# Patient Record
Sex: FEMALE | Race: WHITE
[De-identification: names, ages, dates, MRNs, and addresses within clinical notes are randomized per-mention and may not be internally consistent; named-entity substitution may affect disease eponyms.]

---

## 2017-04-21 ENCOUNTER — HOSPITAL ENCOUNTER (EMERGENCY)
Dept: HOSPITAL 26 - MED | Age: 42
Discharge: HOME | End: 2017-04-21
Payer: COMMERCIAL

## 2017-04-21 VITALS — SYSTOLIC BLOOD PRESSURE: 136 MMHG | DIASTOLIC BLOOD PRESSURE: 83 MMHG

## 2017-04-21 VITALS — WEIGHT: 245 LBS | HEIGHT: 65 IN | BODY MASS INDEX: 40.82 KG/M2

## 2017-04-21 VITALS — DIASTOLIC BLOOD PRESSURE: 79 MMHG | SYSTOLIC BLOOD PRESSURE: 138 MMHG

## 2017-04-21 DIAGNOSIS — R60.9: Primary | ICD-10-CM

## 2017-04-21 DIAGNOSIS — Z85.42: ICD-10-CM

## 2017-04-21 DIAGNOSIS — Z88.1: ICD-10-CM

## 2017-04-21 DIAGNOSIS — R05: ICD-10-CM

## 2017-04-21 LAB
ALBUMIN FLD-MCNC: 3.7 G/DL (ref 3.4–5)
ALP SERPL-CCNC: 54 U/L (ref 46–116)
ALT SERPL-CCNC: 22 U/L (ref 12–78)
ANION GAP SERPL CALCULATED.3IONS-SCNC: 13.3 MMOL/L (ref 8–16)
APTT PPP: 25.6 SECS (ref 22–35.6)
AST SERPL-CCNC: 13 U/L (ref 15–37)
BASOPHILS # BLD AUTO: 0.1 K/UL (ref 0–0.22)
BASOPHILS NFR BLD AUTO: 1.1 % (ref 0–2)
BILIRUB SERPL-MCNC: 0.4 MG/DL (ref 0–1)
BUN SERPL-MCNC: 16 MG/DL (ref 7–18)
CALCIUM SPEC-MCNC: 8.8 MG/DL (ref 8.5–10.1)
CHLORIDE SERPL-SCNC: 102 MMOL/L (ref 98–107)
CO2 SERPL-SCNC: 28.6 MMOL/L (ref 21–32)
CREAT SERPL-MCNC: 0.7 MG/DL (ref 0.6–1.3)
EOSINOPHIL # BLD AUTO: 0.1 K/UL (ref 0–0.4)
EOSINOPHIL NFR BLD AUTO: 1.3 % (ref 0–4)
ERYTHROCYTE [DISTWIDTH] IN BLOOD BY AUTOMATED COUNT: 13.5 % (ref 11.6–13.7)
GFR SERPL CREATININE-BSD FRML MDRD: 119 ML/MIN (ref 90–?)
GLUCOSE SERPL-MCNC: 77 MG/DL (ref 74–106)
HCT VFR BLD AUTO: 41 % (ref 36–48)
HGB BLD-MCNC: 13.3 G/DL (ref 12–16)
INR PPP: 1.1 (ref 0.8–1.2)
LYMPHOCYTES # BLD AUTO: 2.8 K/UL (ref 2.5–16.5)
LYMPHOCYTES NFR BLD AUTO: 26.5 % (ref 20.5–51.1)
MCH RBC QN AUTO: 28 PG (ref 27–31)
MCHC RBC AUTO-ENTMCNC: 32 G/DL (ref 33–37)
MCV RBC AUTO: 87 FL (ref 80–94)
MONOCYTES # BLD AUTO: 0.8 K/UL (ref 0.8–1)
MONOCYTES NFR BLD AUTO: 8 % (ref 1.7–9.3)
NEUTROPHILS # BLD AUTO: 6.7 K/UL (ref 1.8–7.7)
NEUTROPHILS NFR BLD AUTO: 63.1 % (ref 42.2–75.2)
PLATELET # BLD AUTO: 384 K/UL (ref 140–450)
POTASSIUM SERPL-SCNC: 3.9 MMOL/L (ref 3.5–5.1)
PROT SERPL-MCNC: 8 G/DL (ref 6.4–8.2)
PROTHROMBIN TIME: 10.1 SECS (ref 10.8–13.4)
RBC # BLD AUTO: 4.69 MIL/UL (ref 4.2–5.4)
SODIUM SERPL-SCNC: 140 MMOL/L (ref 136–145)
WBC # BLD AUTO: 10.5 K/UL (ref 4.8–10.8)

## 2017-04-21 NOTE — NUR
41Y F PRESENTED TO ER C/O OF SWELLING TO BOTH LEGS X1 WK. ALSO C/O OF TINGLING 
AND NUMBNESS TO LT LEG. PAIN IS 7/10 IN SCALE. NO DISTRESS NOTED. V/S WNL.

## 2017-04-21 NOTE — NUR
Patient discharged with v/s stable. Written and verbal after care instructions 
given and explained. 

Patient alert, oriented and verbalized understanding of instructions. 
Ambulatory with steady gait. All questions addressed prior to discharge. ID 
band removed. Patient advised to follow up with PMD. Rx of PREDNISONE given. 
Patient educated on indication of medication including possible reaction and 
side effects. Opportunity to ask questions provided and answered.

## 2017-10-06 ENCOUNTER — HOSPITAL ENCOUNTER (OUTPATIENT)
Dept: HOSPITAL 1 - LB | Age: 42
Discharge: HOME | End: 2017-10-06
Payer: COMMERCIAL

## 2017-10-06 DIAGNOSIS — F31.9: Primary | ICD-10-CM

## 2017-10-06 LAB
ALBUMIN SERPL-MCNC: 3.7 G/DL (ref 3.4–5)
ALP SERPL-CCNC: 53 U/L (ref 46–116)
ALT SERPL-CCNC: 19 U/L (ref 14–59)
AST SERPL-CCNC: 12 U/L (ref 15–37)
BILIRUB SERPL-MCNC: 0.31 MG/DL (ref 0.2–1)
BUN SERPL-MCNC: 13 MG/DL (ref 7–18)
CALCIUM SERPL-MCNC: 8.8 MG/DL (ref 8.5–10.1)
CHLORIDE SERPL-SCNC: 101 MMOL/L (ref 98–107)
CHOLEST SERPL-MCNC: 152 MG/DL (ref ?–200)
CHOLEST/HDLC SERPL: 2.9 MG/DL
CO2 SERPL-SCNC: 31.3 MMOL/L (ref 21–32)
CREAT SERPL-MCNC: 0.8 MG/DL (ref 0.6–1)
GFR SERPLBLD BASED ON 1.73 SQ M-ARVRAT: > 60 ML/MIN
GLUCOSE SERPL-MCNC: 93 MG/DL (ref 74–106)
HDLC SERPL-MCNC: 52 MG/DL (ref 40–60)
POTASSIUM SERPL-SCNC: 3.7 MMOL/L (ref 3.5–5.1)
PROT SERPL-MCNC: 8.3 G/DL (ref 6.4–8.2)
SODIUM SERPL-SCNC: 138 MMOL/L (ref 136–145)
TRIGL SERPL-MCNC: 62 MG/DL (ref ?–150)

## 2017-11-02 ENCOUNTER — HOSPITAL ENCOUNTER (EMERGENCY)
Dept: HOSPITAL 26 - MED | Age: 42
Discharge: HOME | End: 2017-11-02
Payer: COMMERCIAL

## 2017-11-02 VITALS — DIASTOLIC BLOOD PRESSURE: 66 MMHG | SYSTOLIC BLOOD PRESSURE: 119 MMHG

## 2017-11-02 VITALS — HEIGHT: 63 IN | BODY MASS INDEX: 44.3 KG/M2 | WEIGHT: 250 LBS

## 2017-11-02 DIAGNOSIS — Z88.1: ICD-10-CM

## 2017-11-02 DIAGNOSIS — Z90.89: ICD-10-CM

## 2017-11-02 DIAGNOSIS — R60.0: Primary | ICD-10-CM

## 2017-11-02 DIAGNOSIS — Z90.710: ICD-10-CM

## 2017-11-02 PROCEDURE — 99284 EMERGENCY DEPT VISIT MOD MDM: CPT

## 2017-11-02 PROCEDURE — 73610 X-RAY EXAM OF ANKLE: CPT

## 2017-11-02 PROCEDURE — 96372 THER/PROPH/DIAG INJ SC/IM: CPT

## 2017-11-02 PROCEDURE — 93971 EXTREMITY STUDY: CPT

## 2017-11-02 NOTE — NUR
PATIENT PRESENTS TO ED WITH LEFT FOOT/ANKLE AND SWELLING SINCE MONDAY , ON AND 
OFF FOR 2 WEEKS. SEEN BY PMD TODAY R/O DVT, NO TRAUMA NOR INJURY

PT DENIES N/V/D; SKIN IS PINK/WARM/DRY; AAOX4 WITH EVEN AND STEADY GAIT; LUNGS 
CLEAR BL; HR EVEN AND REGULAR; PT DENIES ANY FEVER, CP, SOB, OR COUGH AT THIS 
TIME; PATIENT STATES PAIN OF 8/10 AT THIS TIME; VSS; PATIENT POSITIONED FOR 
COMFORT; HOB ELEVATED; BEDRAILS UP X2; BED DOWN. ER MD MADE AWARE OF PT STATUS.

## 2017-11-20 ENCOUNTER — HOSPITAL ENCOUNTER (OUTPATIENT)
Dept: HOSPITAL 1 - MI | Age: 42
Discharge: HOME | End: 2017-11-20
Attending: FAMILY MEDICINE
Payer: COMMERCIAL

## 2017-11-20 DIAGNOSIS — M25.472: Primary | ICD-10-CM

## 2017-11-20 PROCEDURE — A9577 INJ MULTIHANCE: HCPCS

## 2017-11-20 PROCEDURE — BQ3HYZZ MAGNETIC RESONANCE IMAGING (MRI) OF LEFT ANKLE USING OTHER CONTRAST: ICD-10-PCS

## 2017-11-27 ENCOUNTER — HOSPITAL ENCOUNTER (OUTPATIENT)
Dept: HOSPITAL 26 - MLB | Age: 42
End: 2017-11-27
Payer: COMMERCIAL

## 2017-11-27 DIAGNOSIS — M25.472: Primary | ICD-10-CM

## 2017-11-29 ENCOUNTER — HOSPITAL ENCOUNTER (OUTPATIENT)
Dept: HOSPITAL 26 - MLB | Age: 42
Discharge: HOME | End: 2017-11-29
Payer: COMMERCIAL

## 2017-11-29 DIAGNOSIS — F41.1: ICD-10-CM

## 2017-11-29 DIAGNOSIS — F31.9: ICD-10-CM

## 2017-11-29 DIAGNOSIS — E55.9: Primary | ICD-10-CM

## 2017-12-19 ENCOUNTER — HOSPITAL ENCOUNTER (OUTPATIENT)
Dept: HOSPITAL 1 - RD | Age: 42
Discharge: HOME | End: 2017-12-19
Payer: COMMERCIAL

## 2017-12-19 DIAGNOSIS — R60.9: Primary | ICD-10-CM

## 2017-12-26 ENCOUNTER — HOSPITAL ENCOUNTER (OUTPATIENT)
Dept: HOSPITAL 1 - US | Age: 42
Discharge: HOME | End: 2017-12-26
Payer: COMMERCIAL

## 2017-12-26 DIAGNOSIS — R60.9: ICD-10-CM

## 2017-12-26 DIAGNOSIS — R10.2: Primary | ICD-10-CM

## 2017-12-26 DIAGNOSIS — M25.572: ICD-10-CM

## 2017-12-26 PROCEDURE — B54CZZZ ULTRASONOGRAPHY OF LEFT LOWER EXTREMITY VEINS: ICD-10-PCS

## 2017-12-26 PROCEDURE — BW4GZZZ ULTRASONOGRAPHY OF PELVIC REGION: ICD-10-PCS

## 2018-04-06 ENCOUNTER — HOSPITAL ENCOUNTER (OUTPATIENT)
Dept: HOSPITAL 1 - US | Age: 43
Discharge: HOME | End: 2018-04-06
Attending: FAMILY MEDICINE
Payer: COMMERCIAL

## 2018-04-06 DIAGNOSIS — R60.0: ICD-10-CM

## 2018-04-06 DIAGNOSIS — M54.5: Primary | ICD-10-CM

## 2018-04-06 PROCEDURE — BW211ZZ COMPUTERIZED TOMOGRAPHY (CT SCAN) OF ABDOMEN AND PELVIS USING LOW OSMOLAR CONTRAST: ICD-10-PCS

## 2018-04-06 PROCEDURE — BR29ZZZ COMPUTERIZED TOMOGRAPHY (CT SCAN) OF LUMBAR SPINE: ICD-10-PCS

## 2018-04-06 PROCEDURE — B44HZZZ ULTRASONOGRAPHY OF BILATERAL LOWER EXTREMITY ARTERIES: ICD-10-PCS

## 2018-06-27 ENCOUNTER — HOSPITAL ENCOUNTER (OUTPATIENT)
Dept: HOSPITAL 26 - MLB | Age: 43
Discharge: HOME | End: 2018-06-27
Attending: OBSTETRICS & GYNECOLOGY
Payer: COMMERCIAL

## 2018-06-27 DIAGNOSIS — N95.1: Primary | ICD-10-CM

## 2018-06-27 DIAGNOSIS — R10.2: ICD-10-CM

## 2018-06-28 LAB — FSH SERPL-ACNC: 1.8 MIU/ML

## 2018-06-29 ENCOUNTER — HOSPITAL ENCOUNTER (OUTPATIENT)
Dept: HOSPITAL 1 - MA | Age: 43
Discharge: HOME | End: 2018-06-29
Attending: OBSTETRICS & GYNECOLOGY
Payer: COMMERCIAL

## 2018-06-29 DIAGNOSIS — R10.2: ICD-10-CM

## 2018-06-29 DIAGNOSIS — Z12.31: Primary | ICD-10-CM

## 2018-06-29 DIAGNOSIS — N95.1: ICD-10-CM

## 2018-06-29 PROCEDURE — BH02ZZZ PLAIN RADIOGRAPHY OF BILATERAL BREASTS: ICD-10-PCS | Performed by: RADIOLOGY

## 2019-02-01 ENCOUNTER — HOSPITAL ENCOUNTER (EMERGENCY)
Dept: HOSPITAL 26 - MED | Age: 44
Discharge: HOME | End: 2019-02-01
Payer: COMMERCIAL

## 2019-02-01 VITALS — SYSTOLIC BLOOD PRESSURE: 122 MMHG | DIASTOLIC BLOOD PRESSURE: 70 MMHG

## 2019-02-01 VITALS — SYSTOLIC BLOOD PRESSURE: 127 MMHG | DIASTOLIC BLOOD PRESSURE: 75 MMHG

## 2019-02-01 VITALS — WEIGHT: 247 LBS | HEIGHT: 63 IN | BODY MASS INDEX: 43.77 KG/M2
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
## 2019-02-01 DIAGNOSIS — S80.02XA: Primary | ICD-10-CM
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
## 2019-02-01 DIAGNOSIS — Z88.8: ICD-10-CM

## 2019-02-01 DIAGNOSIS — Z88.1: ICD-10-CM

## 2019-02-01 DIAGNOSIS — Z88.6: ICD-10-CM
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
## 2019-02-01 PROCEDURE — 99283 EMERGENCY DEPT VISIT LOW MDM: CPT

## 2019-02-01 PROCEDURE — 73562 X-RAY EXAM OF KNEE 3: CPT

## 2019-02-01 NOTE — NUR
BIB SELF WITH C/O LEFT KNEE PAIN X 2 WEEKS, PT STATES SHE HAS POPPING AND 
BURNING PAIN ON THE LEFT KNEE, EVEN AND STEADY GAIT. DENIES N/V/D; SKIN IS 
PINK/WARM/DRY; AAOX4 WITH EVEN AND STEADY GAIT; LUNGS CLEAR BL; HR EVEN AND 
REGULAR; PT DENIES ANY FEVER, CP, SOB, OR COUGH AT THIS TIME; VSS; PATIENT 
POSITIONED FOR COMFORT; HOB ELEVATED; BEDRAILS UP X2; BED DOWN. ER MD MADE 
AWARE OF PT STATUS.

## 2019-02-01 NOTE — NUR
Patient discharged with v/s stable. Written and verbal after care instructions 
given and explained. 

Patient alert, oriented and verbalized understanding of instructions. 
Ambulatory with steady gait. All questions addressed prior to discharge. ID 
band removed. Patient advised to follow up with PMD.No Rx given. Patient 
educated on indication of medication including possible reaction and side 
effects. Opportunity to ask questions provided and answered.

## 2019-02-11 LAB
BASOPHILS NFR BLD: 1 % (ref 0–2)
ERYTHROCYTE [DISTWIDTH] IN BLOOD BY AUTOMATED COUNT: 13.9 % (ref 11.5–14.5)
MICROSCOPIC UR-IMP: YES
PLATELET # BLD: 383 X10^3MCL (ref 130–400)
RBC # UR STRIP.AUTO: (no result) /UL
UA SPECIFIC GRAVITY: 1.02 (ref 1–1.03)

## 2019-02-13 LAB
BUN SERPL-MCNC: 14 MG/DL (ref 7–18)
CALCIUM SERPL-MCNC: 8.5 MG/DL (ref 8.5–10.1)
CHLORIDE SERPL-SCNC: 103 MMOL/L (ref 98–107)
CO2 SERPL-SCNC: 28.2 MMOL/L (ref 21–32)
CREAT SERPL-MCNC: 0.7 MG/DL (ref 0.6–1)
GFR SERPLBLD BASED ON 1.73 SQ M-ARVRAT: > 60 ML/MIN
GLUCOSE SERPL-MCNC: 83 MG/DL (ref 74–106)
POTASSIUM SERPL-SCNC: 3.5 MMOL/L (ref 3.5–5.1)
SODIUM SERPL-SCNC: 140 MMOL/L (ref 136–145)

## 2019-02-14 ENCOUNTER — HOSPITAL ENCOUNTER (OUTPATIENT)
Dept: HOSPITAL 1 - DS | Age: 44
Setting detail: OBSERVATION
LOS: 1 days | Discharge: HOME | DRG: 354 | End: 2019-02-15
Attending: SURGERY | Admitting: SURGERY
Payer: COMMERCIAL

## 2019-02-14 VITALS — SYSTOLIC BLOOD PRESSURE: 122 MMHG | DIASTOLIC BLOOD PRESSURE: 82 MMHG

## 2019-02-14 VITALS — WEIGHT: 257 LBS | BODY MASS INDEX: 45.54 KG/M2 | HEIGHT: 63 IN

## 2019-02-14 VITALS — SYSTOLIC BLOOD PRESSURE: 146 MMHG | DIASTOLIC BLOOD PRESSURE: 83 MMHG

## 2019-02-14 VITALS — SYSTOLIC BLOOD PRESSURE: 134 MMHG | DIASTOLIC BLOOD PRESSURE: 86 MMHG

## 2019-02-14 VITALS — SYSTOLIC BLOOD PRESSURE: 128 MMHG | DIASTOLIC BLOOD PRESSURE: 78 MMHG

## 2019-02-14 DIAGNOSIS — Z85.42: ICD-10-CM

## 2019-02-14 DIAGNOSIS — E66.9: ICD-10-CM

## 2019-02-14 DIAGNOSIS — K45.8: Primary | ICD-10-CM

## 2019-02-14 DIAGNOSIS — I10: ICD-10-CM

## 2019-02-14 DIAGNOSIS — G47.33: ICD-10-CM

## 2019-02-14 DIAGNOSIS — K21.9: ICD-10-CM

## 2019-02-14 PROCEDURE — 0WUF0JZ SUPPLEMENT ABDOMINAL WALL WITH SYNTHETIC SUBSTITUTE, OPEN APPROACH: ICD-10-PCS | Performed by: SURGERY

## 2019-02-14 PROCEDURE — G0378 HOSPITAL OBSERVATION PER HR: HCPCS

## 2019-02-14 PROCEDURE — C1781 MESH (IMPLANTABLE): HCPCS

## 2019-02-14 NOTE — NUR
RECEIVED PT FROM PREVIOUS SHIFT NURSE. PT AOX4. DENIES HA/DIZZINESS. MED SURG
PT. DENIES CP/PRESSURE. PULSES PALPABLE, NO EDEMA NOTED. LUNG SOUNDS CLEAR, ON
2L NC. DENIES SOB/DIFFICULTY BREATHING. BOWEL SOUNDS ACTIVE. VOIDS FREELY.
HERNIA NOTED TO R. ABD. GEN WEAKNESS. AMB WITH ASSIST. L BACK/FLANK INCISION
WITH 8 STAPLES AND SURGICAL DSG. IV TO LFA, INTACT AND PATENT. BED IN LOWEST
POSITION. CALL LIGHT WITHIN REACH. WILL CONTINUE TO MONITOR.

## 2019-02-14 NOTE — NUR
PT LAYING DOWN IN BED WITH HOB UP. PT IS ON 2L NC. GAVE PAIN MEDICATION AND PT
STATES SOME RELIEF FROM THE MEDICATION. REPOSITIONED PT TO AID WITH PAIN.
PLACED A PILLOW UNDER LEFT SIDE TO TAKE PRESSURE OF INCISIONAL SITE. PT STATES
RELIEF FROM THE PAIN. CALL LIGHT WITHIN REACH, WILL CONTINUE TO MONITOR.

## 2019-02-14 NOTE — NUR
PT C/O PAIN 8/10, AND C/O NAUSEA WITH 2 EPISODES OF VOMITING. MEDICATED PER
EMAR. WILL CONTINUE TO MONITOR.

## 2019-02-14 NOTE — NUR
PT RESTING IN BED. RESPIRATIONS EVEN AND UNLABORED. PT LOOKS TO BE IN NO ACUTE
DISTRESS AT THIS TIME. PILLOW UNDER THE LEFT SIDE TO HELP TAKE THE PRESSURE
OFF SURGICAL SITE. PT STATES PAIN IS TOLERABLE AT THIS TIME. IV SITE TO LEFT
FOREARM IS PATENT WITH NO SIGNS OF REDNESS OR SWELLING. CALL LIGHT WITHIN
REACH. WILL ENDORSE TO ONCOMING SHIFT.

## 2019-02-14 NOTE — NUR
RECEIVED PT FROM PACU. TRANSFERED BY DOROTHY DONOVAN VIA [facility]. PT LOOKS TO BE IN
NO ACUTE DISTRESS AT THIS TIME. PT HAS AN IV TO THE LEFT FOREARM WITH NO SIGNS
OF REDNESS OR SWELLING. PT STATES FEELING PAIN AT THIS TIME, WILL MEDICATE
ACCORDING TO EMAR. BED IN LOWEST POSITION. CALL LIGHT WITHIN REACH. WILL
CONITNUE TO MONITOR.

## 2019-02-15 VITALS — SYSTOLIC BLOOD PRESSURE: 147 MMHG | DIASTOLIC BLOOD PRESSURE: 90 MMHG

## 2019-02-15 VITALS — DIASTOLIC BLOOD PRESSURE: 53 MMHG | SYSTOLIC BLOOD PRESSURE: 118 MMHG

## 2019-02-15 LAB
BASOPHILS NFR BLD: 0.5 % (ref 0–2)
BUN SERPL-MCNC: 14 MG/DL (ref 7–18)
CALCIUM SERPL-MCNC: 8.9 MG/DL (ref 8.5–10.1)
CHLORIDE SERPL-SCNC: 101 MMOL/L (ref 98–107)
CO2 SERPL-SCNC: 28.9 MMOL/L (ref 21–32)
CREAT SERPL-MCNC: 0.7 MG/DL (ref 0.6–1)
ERYTHROCYTE [DISTWIDTH] IN BLOOD BY AUTOMATED COUNT: 13.8 % (ref 11.5–14.5)
GFR SERPLBLD BASED ON 1.73 SQ M-ARVRAT: > 60 ML/MIN
GLUCOSE SERPL-MCNC: 100 MG/DL (ref 74–106)
PLATELET # BLD: 299 X10^3MCL (ref 130–400)
POTASSIUM SERPL-SCNC: 4 MMOL/L (ref 3.5–5.1)
SODIUM SERPL-SCNC: 135 MMOL/L (ref 136–145)

## 2019-02-15 NOTE — NUR
AAO X4.C/O L BACK INCISSIONAL PAIN AT 5/10 PAIN SCALE.WAS JUST MEDICATED BY
NOC RN WITH DILAUDID AT 0600.S/P L VENTRAL HERNIA REPAIR.PT NON TELE.IV SALINE
LOCKED.L BACK WITH 8 STAPLES WITH DRESSING CDI.CALL LIGHT WITHIN
REACH.INSTRUCTED TO CALL FOR ANY PAIN/DISCOMFORT.WILL CONTINUE TO MONITOR PT.

## 2019-02-15 NOTE — NUR
PT D/C TO HOME IV D/C'D.RX AND DISCHARGE INSTRUCTION GIVEN PT VERBALIZES
UNDERSTANDING.WENT DOWN VIA WHEELCHAIR ACCOMPANIED BY FAMILY AND CNA.

## 2019-03-29 ENCOUNTER — HOSPITAL ENCOUNTER (OUTPATIENT)
Dept: HOSPITAL 4 - SMI | Age: 44
Discharge: HOME | End: 2019-03-29
Payer: COMMERCIAL

## 2019-03-29 DIAGNOSIS — M25.562: Primary | ICD-10-CM

## 2019-05-23 LAB
BASOPHILS NFR BLD: 0.7 % (ref 0–2)
BUN SERPL-MCNC: 15 MG/DL (ref 7–18)
CALCIUM SERPL-MCNC: 9.4 MG/DL (ref 8.5–10.1)
CHLORIDE SERPL-SCNC: 104 MMOL/L (ref 98–107)
CO2 SERPL-SCNC: 29 MMOL/L (ref 21–32)
CREAT SERPL-MCNC: 0.5 MG/DL (ref 0.6–1)
ERYTHROCYTE [DISTWIDTH] IN BLOOD BY AUTOMATED COUNT: 12.9 % (ref 11.5–14.5)
GFR SERPLBLD BASED ON 1.73 SQ M-ARVRAT: > 60 ML/MIN
GLUCOSE SERPL-MCNC: 80 MG/DL (ref 74–106)
PLATELET # BLD: 382 X10^3MCL (ref 130–400)
POTASSIUM SERPL-SCNC: 4.3 MMOL/L (ref 3.5–5.1)
SODIUM SERPL-SCNC: 139 MMOL/L (ref 136–145)

## 2019-05-30 ENCOUNTER — HOSPITAL ENCOUNTER (INPATIENT)
Dept: HOSPITAL 1 - MU | Age: 44
LOS: 1 days | Discharge: HOME | DRG: 354 | End: 2019-05-31
Attending: SURGERY | Admitting: SURGERY
Payer: COMMERCIAL

## 2019-05-30 VITALS — BODY MASS INDEX: 44.29 KG/M2 | HEIGHT: 63 IN | WEIGHT: 249.98 LBS

## 2019-05-30 VITALS — SYSTOLIC BLOOD PRESSURE: 139 MMHG | DIASTOLIC BLOOD PRESSURE: 92 MMHG

## 2019-05-30 VITALS — DIASTOLIC BLOOD PRESSURE: 92 MMHG | SYSTOLIC BLOOD PRESSURE: 125 MMHG

## 2019-05-30 DIAGNOSIS — E66.01: ICD-10-CM

## 2019-05-30 DIAGNOSIS — Z88.6: ICD-10-CM

## 2019-05-30 DIAGNOSIS — Z90.49: ICD-10-CM

## 2019-05-30 DIAGNOSIS — K43.2: Primary | ICD-10-CM

## 2019-05-30 DIAGNOSIS — I10: ICD-10-CM

## 2019-05-30 DIAGNOSIS — Z90.710: ICD-10-CM

## 2019-05-30 DIAGNOSIS — K21.9: ICD-10-CM

## 2019-05-30 DIAGNOSIS — G89.4: ICD-10-CM

## 2019-05-30 DIAGNOSIS — Z88.0: ICD-10-CM

## 2019-05-30 PROCEDURE — 0WUF0JZ SUPPLEMENT ABDOMINAL WALL WITH SYNTHETIC SUBSTITUTE, OPEN APPROACH: ICD-10-PCS | Performed by: SURGERY

## 2019-05-30 PROCEDURE — C1781 MESH (IMPLANTABLE): HCPCS

## 2019-05-30 NOTE — NUR
RECEIVED PT FR. SOUSA,RESOURCE NURSE.PT DROWSY.DENIES ANY PAIN.IV SALINE
LOCKED.ABDOMINAL INCISSION WITH DRESSING CDI COVERED WITH ABD'L BINDER.CALL
LIGHT WITHIN REACH.INSTRUCTED TO CALL FOR ANY PAIN/DISCOMFORT.WILL CONTINUE TO
MONITOR PT.

## 2019-05-30 NOTE — NUR
RECIEVED PT FROM OR, PT ADMIT FOR VENTRAL HERNIA REPAIR. PT IS A/O X4, BUT
DROWSY. LUNG SOUND DIM FLYNN BASE, DENY ANY SOB, PT IS ON 4L/MIN O2 VIA NC. PO2
95% PT DENY ANY CHEST PAIN OR DISCOMFORT, BOWEL SOUND ABSCENT ALL 4 QUADRANTS,
SURGICAL SITE AT MID ABD, COVERED WITH DRY DRESSING AND ABD BINDER. FIRST
DRESSING, UNABLE TO REMOVE TO TAKE PICTURE. PEDAL PULSE PRESENT BOTH FEET,
TRACE EDEMA BLE. IV AT RIGHT FA, NO LEAKING, NO INFILTRAITON. ALL ADLS ASSIST,
ALL NEED MET, CALL LIGHT IN REACH, WILL CONTINUE TO MONITOR.

## 2019-05-31 VITALS — DIASTOLIC BLOOD PRESSURE: 60 MMHG | SYSTOLIC BLOOD PRESSURE: 115 MMHG

## 2019-05-31 VITALS — SYSTOLIC BLOOD PRESSURE: 105 MMHG | DIASTOLIC BLOOD PRESSURE: 78 MMHG

## 2019-05-31 VITALS — DIASTOLIC BLOOD PRESSURE: 78 MMHG | SYSTOLIC BLOOD PRESSURE: 105 MMHG

## 2019-05-31 LAB
BASOPHILS NFR BLD: 0.2 % (ref 0–2)
BUN SERPL-MCNC: 16 MG/DL (ref 7–18)
CALCIUM SERPL-MCNC: 9.4 MG/DL (ref 8.5–10.1)
CHLORIDE SERPL-SCNC: 103 MMOL/L (ref 98–107)
CO2 SERPL-SCNC: 26.4 MMOL/L (ref 21–32)
CREAT SERPL-MCNC: 0.7 MG/DL (ref 0.6–1)
ERYTHROCYTE [DISTWIDTH] IN BLOOD BY AUTOMATED COUNT: 14 % (ref 11.5–14.5)
GFR SERPLBLD BASED ON 1.73 SQ M-ARVRAT: > 60 ML/MIN
GLUCOSE SERPL-MCNC: 135 MG/DL (ref 74–106)
PLATELET # BLD: 339 X10^3MCL (ref 130–400)
POTASSIUM SERPL-SCNC: 3.9 MMOL/L (ref 3.5–5.1)
SODIUM SERPL-SCNC: 140 MMOL/L (ref 136–145)

## 2019-05-31 NOTE — NUR
PT AA/OX4. PT COMPLAINT OF PAIN TO ABDOMEN, ACHING, RATES PAIN 7/10,
CONTINUOUS, WORSE WITH MOVEMENT. GIVEN PAIN MED, SEE MAR. NO S/S OF ACUTE
DISTRESS. VS STABLE.  NO N/V. NO CHEST PAIN. IV WNL TO RFA, SALINE LOCKED. PT
CALM/COOPERATIVE. BED IN LOW POSITION. CALL LIGHT WITHIN REACH. DRESSING TO
ABD. CDI. ABDOMINAL BINDER IN PLACE. SCDS IN PLACE. INSTRUCTED TO USE CALL
LIGHT TO CALL FOR ASSISTANCE PRN. VERBALIZED UNDERSTANDING.

## 2019-05-31 NOTE — NUR
PT A/OX4 UPON DC. DENIES ANY ABD PAIN. EDUCATION PROVIDED. NEW RX GIVEN.
INSTRUCTED TO FOLLOW UP WITH PCP. PT VERBALIZED UNDERSTANDING. IV REMOVED AND
CATH INTACT. PERSONAL BELONGINGS TAKEN HOME. ACCOMPANIED BY RN TO LOBBY.

## 2019-05-31 NOTE — NUR
RECEIVED PT FROM SHIFT NURSE A/OX4 AMBULATING AROUND ROOM. C/O OF ABD PAIN.
WILL MEDICATE PER EMAR. IV INTACT AND PATENT. CALL LIGHT WITHIN REACH. WILL
CONTINUE TO MONITOR.

## 2019-06-27 ENCOUNTER — HOSPITAL ENCOUNTER (OUTPATIENT)
Dept: HOSPITAL 26 - MLB | Age: 44
Discharge: HOME | End: 2019-06-27
Payer: COMMERCIAL

## 2019-06-27 DIAGNOSIS — Z01.419: Primary | ICD-10-CM

## 2019-08-10 ENCOUNTER — HOSPITAL ENCOUNTER (OUTPATIENT)
Dept: HOSPITAL 26 - MLB | Age: 44
Discharge: HOME | End: 2019-08-10
Payer: COMMERCIAL

## 2019-08-10 DIAGNOSIS — Z00.01: Primary | ICD-10-CM

## 2019-08-10 DIAGNOSIS — E66.9: ICD-10-CM

## 2019-08-10 LAB
ALBUMIN FLD-MCNC: 3.6 G/DL (ref 3.4–5)
ANION GAP SERPL CALCULATED.3IONS-SCNC: 11.6 MMOL/L (ref 8–16)
APPEARANCE UR: (no result)
AST SERPL-CCNC: 12 U/L (ref 15–37)
BASOPHILS # BLD AUTO: 0 K/UL (ref 0–0.22)
BASOPHILS NFR BLD AUTO: 0.5 % (ref 0–2)
BILIRUB SERPL-MCNC: 0.4 MG/DL (ref 0–1)
BILIRUB UR QL STRIP: NEGATIVE
BUN SERPL-MCNC: 13 MG/DL (ref 7–18)
CHLORIDE SERPL-SCNC: 102 MMOL/L (ref 98–107)
CHOLEST/HDLC SERPL: 3.3 {RATIO} (ref 1–4.5)
CO2 SERPL-SCNC: 30 MMOL/L (ref 21–32)
COLOR UR: YELLOW
CREAT SERPL-MCNC: 0.7 MG/DL (ref 0.6–1.3)
EOSINOPHIL # BLD AUTO: 0.1 K/UL (ref 0–0.4)
EOSINOPHIL NFR BLD AUTO: 1.8 % (ref 0–4)
ERYTHROCYTE [DISTWIDTH] IN BLOOD BY AUTOMATED COUNT: 13.8 % (ref 11.6–13.7)
GFR SERPL CREATININE-BSD FRML MDRD: 117 ML/MIN (ref 90–?)
GLUCOSE SERPL-MCNC: 83 MG/DL (ref 74–106)
GLUCOSE UR STRIP-MCNC: NEGATIVE MG/DL
HCT VFR BLD AUTO: 39.2 % (ref 36–48)
HDLC SERPL-MCNC: 44 MG/DL (ref 40–60)
HGB BLD-MCNC: 13.1 G/DL (ref 12–16)
HGB UR QL STRIP: (no result)
LDLC SERPL CALC-MCNC: 80 MG/DL (ref 60–100)
LEUKOCYTE ESTERASE UR QL STRIP: NEGATIVE
LYMPHOCYTES # BLD AUTO: 1.8 K/UL (ref 2.5–16.5)
LYMPHOCYTES NFR BLD AUTO: 24.4 % (ref 20.5–51.1)
MAGNESIUM SERPL-MCNC: 1.6 MG/DL (ref 1.8–2.4)
MCH RBC QN AUTO: 29 PG (ref 27–31)
MCHC RBC AUTO-ENTMCNC: 34 G/DL (ref 33–37)
MCV RBC AUTO: 87.8 FL (ref 80–94)
MONOCYTES # BLD AUTO: 0.4 K/UL (ref 0.8–1)
MONOCYTES NFR BLD AUTO: 5.3 % (ref 1.7–9.3)
NEUTROPHILS # BLD AUTO: 5.1 K/UL (ref 1.8–7.7)
NEUTROPHILS NFR BLD AUTO: 68 % (ref 42.2–75.2)
NITRITE UR QL STRIP: NEGATIVE
PH UR STRIP: 5.5 [PH] (ref 5–9)
PLATELET # BLD AUTO: 316 K/UL (ref 140–450)
POTASSIUM SERPL-SCNC: 3.6 MMOL/L (ref 3.5–5.1)
RBC # BLD AUTO: 4.46 MIL/UL (ref 4.2–5.4)
RBC #/AREA URNS HPF: (no result) /HPF (ref 0–5)
SODIUM SERPL-SCNC: 140 MMOL/L (ref 136–145)
TRIGL SERPL-MCNC: 102 MG/DL (ref 30–150)
TSH SERPL DL<=0.05 MIU/L-ACNC: 1.4 UIU/ML (ref 0.34–3.74)
WBC # BLD AUTO: 7.5 K/UL (ref 4.8–10.8)
WBC,URINE: (no result) /HPF (ref 0–5)

## 2019-08-11 LAB
T3 SERPL-MCNC: 145 NG/DL (ref 71–180)
T4 FREE SERPL-MCNC: 1.19 NG/DL (ref 0.82–1.77)

## 2019-09-26 ENCOUNTER — HOSPITAL ENCOUNTER (OUTPATIENT)
Dept: HOSPITAL 1 - MA | Age: 44
Discharge: HOME | End: 2019-09-26
Payer: COMMERCIAL

## 2019-09-26 DIAGNOSIS — Z01.419: Primary | ICD-10-CM

## 2019-09-26 PROCEDURE — BW4GZZZ ULTRASONOGRAPHY OF PELVIC REGION: ICD-10-PCS | Performed by: RADIOLOGY

## 2019-10-29 ENCOUNTER — HOSPITAL ENCOUNTER (EMERGENCY)
Dept: HOSPITAL 26 - MED | Age: 44
LOS: 1 days | Discharge: HOME | End: 2019-10-30
Payer: COMMERCIAL

## 2019-10-29 ENCOUNTER — HOSPITAL ENCOUNTER (EMERGENCY)
Dept: HOSPITAL 1 - ED | Age: 44
Discharge: HOME | End: 2019-10-29
Payer: COMMERCIAL

## 2019-10-29 VITALS — BODY MASS INDEX: 45.18 KG/M2 | HEIGHT: 63 IN | WEIGHT: 255 LBS

## 2019-10-29 VITALS
HEIGHT: 63 IN | BODY MASS INDEX: 45.18 KG/M2 | WEIGHT: 255 LBS | HEIGHT: 63 IN | WEIGHT: 255 LBS | BODY MASS INDEX: 45.18 KG/M2

## 2019-10-29 VITALS — DIASTOLIC BLOOD PRESSURE: 88 MMHG | SYSTOLIC BLOOD PRESSURE: 123 MMHG

## 2019-10-29 VITALS — SYSTOLIC BLOOD PRESSURE: 118 MMHG | DIASTOLIC BLOOD PRESSURE: 64 MMHG
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
## 2019-10-29 DIAGNOSIS — Z88.8: ICD-10-CM

## 2019-10-29 DIAGNOSIS — Z85.42: ICD-10-CM
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
## 2019-10-29 DIAGNOSIS — Z98.890: ICD-10-CM

## 2019-10-29 DIAGNOSIS — I10: ICD-10-CM

## 2019-10-29 DIAGNOSIS — S39.012A: Primary | ICD-10-CM

## 2019-10-29 DIAGNOSIS — I50.9: ICD-10-CM

## 2019-10-29 DIAGNOSIS — Z90.710: ICD-10-CM

## 2019-10-29 DIAGNOSIS — F11.90: ICD-10-CM

## 2019-10-29 DIAGNOSIS — Z88.0: ICD-10-CM
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
## 2019-10-29 DIAGNOSIS — Z90.49: ICD-10-CM

## 2019-10-29 DIAGNOSIS — Z88.1: ICD-10-CM

## 2019-10-29 DIAGNOSIS — M54.41: Primary | ICD-10-CM

## 2019-10-29 DIAGNOSIS — Z88.6: ICD-10-CM
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
## 2019-10-30 VITALS — DIASTOLIC BLOOD PRESSURE: 65 MMHG | SYSTOLIC BLOOD PRESSURE: 112 MMHG

## 2019-11-01 ENCOUNTER — HOSPITAL ENCOUNTER (OUTPATIENT)
Dept: HOSPITAL 1 - MA | Age: 44
Discharge: HOME | End: 2019-11-01
Payer: COMMERCIAL

## 2019-11-01 DIAGNOSIS — Z12.31: ICD-10-CM

## 2019-11-01 DIAGNOSIS — Z01.419: Primary | ICD-10-CM

## 2019-11-01 PROCEDURE — BH02ZZZ PLAIN RADIOGRAPHY OF BILATERAL BREASTS: ICD-10-PCS

## 2019-11-11 ENCOUNTER — HOSPITAL ENCOUNTER (OUTPATIENT)
Dept: HOSPITAL 1 - MI | Age: 44
Discharge: HOME | End: 2019-11-11
Payer: COMMERCIAL

## 2019-11-11 DIAGNOSIS — M54.31: Primary | ICD-10-CM

## 2019-11-11 DIAGNOSIS — M47.26: ICD-10-CM

## 2019-11-11 PROCEDURE — BR39ZZZ MAGNETIC RESONANCE IMAGING (MRI) OF LUMBAR SPINE: ICD-10-PCS

## 2021-11-23 ENCOUNTER — HOSPITAL ENCOUNTER (EMERGENCY)
Dept: HOSPITAL 26 - MED | Age: 46
Discharge: HOME | End: 2021-11-23
Payer: COMMERCIAL

## 2021-11-23 VITALS — DIASTOLIC BLOOD PRESSURE: 95 MMHG | SYSTOLIC BLOOD PRESSURE: 148 MMHG

## 2021-11-23 VITALS — BODY MASS INDEX: 39.99 KG/M2 | WEIGHT: 240 LBS | HEIGHT: 65 IN

## 2021-11-23 VITALS — DIASTOLIC BLOOD PRESSURE: 72 MMHG | SYSTOLIC BLOOD PRESSURE: 132 MMHG

## 2021-11-23 DIAGNOSIS — Z88.8: ICD-10-CM
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
## 2021-11-23 DIAGNOSIS — Z79.899: ICD-10-CM

## 2021-11-23 DIAGNOSIS — Z85.89: ICD-10-CM

## 2021-11-23 DIAGNOSIS — I10: ICD-10-CM

## 2021-11-23 DIAGNOSIS — S39.012A: ICD-10-CM
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
## 2021-11-23 DIAGNOSIS — Z98.890: ICD-10-CM
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
## 2021-11-23 DIAGNOSIS — Z88.1: ICD-10-CM

## 2021-11-23 DIAGNOSIS — S16.1XXA: Primary | ICD-10-CM

## 2021-11-23 PROCEDURE — 99284 EMERGENCY DEPT VISIT MOD MDM: CPT

## 2021-11-23 NOTE — NUR
PATIENT DC HOME FEELING WELL STABLE NOT COMPLAINING OF PAIN AT THIS TIME VITAL 
SIGNS IN NORMAL LIMITS ALL DC INSTRUCTION GAVE AND EXPLAINED WE RECOMMENDED TO 
FOLLOW UP WITH HER PCP //Mendel RN

## 2021-12-06 ENCOUNTER — HOSPITAL ENCOUNTER (EMERGENCY)
Dept: HOSPITAL 26 - MED | Age: 46
LOS: 1 days | Discharge: HOME | End: 2021-12-07
Payer: COMMERCIAL

## 2021-12-06 VITALS — BODY MASS INDEX: 44.3 KG/M2 | HEIGHT: 63 IN | WEIGHT: 250 LBS

## 2021-12-06 VITALS — DIASTOLIC BLOOD PRESSURE: 41 MMHG | SYSTOLIC BLOOD PRESSURE: 91 MMHG

## 2021-12-06 DIAGNOSIS — Z90.710: ICD-10-CM

## 2021-12-06 DIAGNOSIS — Z88.8: ICD-10-CM

## 2021-12-06 DIAGNOSIS — Z88.1: ICD-10-CM

## 2021-12-06 DIAGNOSIS — T78.2XXA: Primary | ICD-10-CM

## 2021-12-06 DIAGNOSIS — Z98.890: ICD-10-CM

## 2021-12-06 DIAGNOSIS — I10: ICD-10-CM

## 2021-12-06 DIAGNOSIS — Z85.89: ICD-10-CM

## 2021-12-06 DIAGNOSIS — Z79.899: ICD-10-CM

## 2021-12-06 LAB
ANION GAP SERPL CALCULATED.3IONS-SCNC: 14.8 MMOL/L (ref 8–16)
BASOPHILS # BLD AUTO: 0 K/UL (ref 0–0.22)
BASOPHILS NFR BLD AUTO: 0.3 % (ref 0–2)
BUN SERPL-MCNC: 16 MG/DL (ref 7–18)
CHLORIDE SERPL-SCNC: 104 MMOL/L (ref 98–107)
CO2 SERPL-SCNC: 24.1 MMOL/L (ref 21–32)
CREAT SERPL-MCNC: 1.3 MG/DL (ref 0.6–1.3)
EOSINOPHIL # BLD AUTO: 0.1 K/UL (ref 0–0.4)
EOSINOPHIL NFR BLD AUTO: 0.7 % (ref 0–4)
ERYTHROCYTE [DISTWIDTH] IN BLOOD BY AUTOMATED COUNT: 13.1 % (ref 11.6–13.7)
GFR SERPL CREATININE-BSD FRML MDRD: 57 ML/MIN (ref 90–?)
GLUCOSE SERPL-MCNC: 189 MG/DL (ref 74–106)
HCT VFR BLD AUTO: 43.8 % (ref 36–48)
HGB BLD-MCNC: 14.7 G/DL (ref 12–16)
LYMPHOCYTES # BLD AUTO: 6.2 K/UL (ref 2.5–16.5)
LYMPHOCYTES NFR BLD AUTO: 49 % (ref 20.5–51.1)
MCH RBC QN AUTO: 29 PG (ref 27–31)
MCHC RBC AUTO-ENTMCNC: 34 G/DL (ref 33–37)
MCV RBC AUTO: 87.7 FL (ref 80–94)
MONOCYTES # BLD AUTO: 0.5 K/UL (ref 0.8–1)
MONOCYTES NFR BLD AUTO: 3.8 % (ref 1.7–9.3)
NEUTROPHILS # BLD AUTO: 5.9 K/UL (ref 1.8–7.7)
NEUTROPHILS NFR BLD AUTO: 46.2 % (ref 42.2–75.2)
PLATELET # BLD AUTO: 412 K/UL (ref 140–450)
POTASSIUM SERPL-SCNC: 2.9 MMOL/L (ref 3.5–5.1)
RBC # BLD AUTO: 5 MIL/UL (ref 4.2–5.4)
SODIUM SERPL-SCNC: 140 MMOL/L (ref 136–145)
WBC # BLD AUTO: 12.7 K/UL (ref 4.8–10.8)

## 2021-12-06 PROCEDURE — 84484 ASSAY OF TROPONIN QUANT: CPT

## 2021-12-06 PROCEDURE — 96365 THER/PROPH/DIAG IV INF INIT: CPT

## 2021-12-06 PROCEDURE — 99285 EMERGENCY DEPT VISIT HI MDM: CPT

## 2021-12-06 PROCEDURE — 71045 X-RAY EXAM CHEST 1 VIEW: CPT

## 2021-12-06 PROCEDURE — 96375 TX/PRO/DX INJ NEW DRUG ADDON: CPT

## 2021-12-06 PROCEDURE — 96361 HYDRATE IV INFUSION ADD-ON: CPT

## 2021-12-06 PROCEDURE — 84703 CHORIONIC GONADOTROPIN ASSAY: CPT

## 2021-12-06 PROCEDURE — 72125 CT NECK SPINE W/O DYE: CPT

## 2021-12-06 PROCEDURE — 70450 CT HEAD/BRAIN W/O DYE: CPT

## 2021-12-06 PROCEDURE — 93005 ELECTROCARDIOGRAM TRACING: CPT

## 2021-12-06 PROCEDURE — 80048 BASIC METABOLIC PNL TOTAL CA: CPT

## 2021-12-06 PROCEDURE — 85025 COMPLETE CBC W/AUTO DIFF WBC: CPT

## 2021-12-06 PROCEDURE — 94640 AIRWAY INHALATION TREATMENT: CPT

## 2021-12-06 PROCEDURE — 36415 COLL VENOUS BLD VENIPUNCTURE: CPT

## 2021-12-06 PROCEDURE — 96366 THER/PROPH/DIAG IV INF ADDON: CPT

## 2021-12-06 PROCEDURE — 96372 THER/PROPH/DIAG INJ SC/IM: CPT

## 2021-12-06 NOTE — NUR
PATIENT AMBLATED INTO ED AND FAINTED IN LOBBY. REPORTS BEING GIVEN AUGMENTIN 
AFTER A DENTIST APPOINTMENT TO WHICH SHE IS VERY ALLERGIC TO. PRESENTS WITH 
SOB, REDNESS AND HIVES TO UPPER EXTREMITIES AND FACE AND TACHYPNEA. PATIENT HIT 
HER HEAD DURING FALL AND WAS BROUGHT INTO ED 11. C COLLAR PLACED FOR 
PRECAUTION.

## 2021-12-06 NOTE — NUR
PATIENT REFUSING TO CONTINUE IV INFUSION OF POTASSIUM AT THIS TIME. ER MD 
NOTIFIED AND ADVISED SHE WILL DC IV ORDER AND ORDER ADDITIONAL PO ORDER

## 2021-12-07 VITALS — SYSTOLIC BLOOD PRESSURE: 91 MMHG | DIASTOLIC BLOOD PRESSURE: 41 MMHG

## 2021-12-07 NOTE — NUR
Patient discharged with v/s stable. Written and verbal after care instructions 
given and explained. 

Patient alert, oriented and verbalized understanding of instructions. 
Ambulatory with steady gait. All questions addressed prior to discharge. ID 
band removed. Patient advised to follow up with PMD. Rx of BENADRYL, EPIPEN, 
AND PREDISONE given. Patient educated on indication of medication including 
possible reaction and side effects. Opportunity to ask questions provided and 
answered.